# Patient Record
Sex: MALE | Race: WHITE | NOT HISPANIC OR LATINO | Employment: PART TIME | URBAN - METROPOLITAN AREA
[De-identification: names, ages, dates, MRNs, and addresses within clinical notes are randomized per-mention and may not be internally consistent; named-entity substitution may affect disease eponyms.]

---

## 2019-05-24 ENCOUNTER — OFFICE VISIT (OUTPATIENT)
Dept: URGENT CARE | Facility: URGENT CARE | Age: 29
End: 2019-05-24

## 2019-05-24 VITALS
HEART RATE: 73 BPM | TEMPERATURE: 98.2 F | DIASTOLIC BLOOD PRESSURE: 78 MMHG | SYSTOLIC BLOOD PRESSURE: 124 MMHG | WEIGHT: 168.8 LBS | OXYGEN SATURATION: 98 %

## 2019-05-24 DIAGNOSIS — H60.502 ACUTE OTITIS EXTERNA OF LEFT EAR, UNSPECIFIED TYPE: Primary | ICD-10-CM

## 2019-05-24 PROCEDURE — 99203 OFFICE O/P NEW LOW 30 MIN: CPT | Performed by: PHYSICIAN ASSISTANT

## 2019-05-24 RX ORDER — NEOMYCIN SULFATE, POLYMYXIN B SULFATE AND HYDROCORTISONE 10; 3.5; 1 MG/ML; MG/ML; [USP'U]/ML
SUSPENSION/ DROPS AURICULAR (OTIC)
Qty: 1 BOTTLE | Refills: 0 | Status: SHIPPED | OUTPATIENT
Start: 2019-05-24

## 2019-05-24 NOTE — PROGRESS NOTES
S: 27 yo male is here for left ear pain and drainage for 3 days.  No fever, sore throat, cough, nasal congestion.  No rash.  Does complain of itching of the left ear canal.  Tried to clean it out with a Q-tip without any relief.      Allergies not on file    No past medical history on file.  Denies allergies.    No family history of asthma.  Social-non-smoker      No current outpatient medications on file prior to visit.  No current facility-administered medications on file prior to visit.     Social History     Tobacco Use     Smoking status: Not on file   Substance Use Topics     Alcohol use: Not on file       ROS:  CONSTITUTIONAL: Negative for fatigue or fever.  EYES: Negative for eye problems.  ENT: As above.  RESP: As above.  CV: Negative for chest pains.  GI: Negative for vomiting.  MUSCULOSKELETAL:  Negative for significant muscle or joint pains.  NEUROLOGIC: Negative for headaches.  SKIN: Negative for rash.  Psych-normal mentation    OBJECTIVE:  /78 (BP Location: Left arm, Patient Position: Chair, Cuff Size: Adult Regular)   Pulse 73   Temp 98.2  F (36.8  C) (Oral)   Wt 76.6 kg (168 lb 12.8 oz)   SpO2 98%     GENERAL APPEARANCE: Healthy, alert and no distress.  EYES:Conjunctiva/sclera clear.  EARS: Right TM is clear.  Left ear with mild tragal tenderness.  Canal is erythematous and inflamed with yellow soupy discharge.  I can see about 30% of the TM and it appears translucent without redness.      NOSE/MOUTH: Nose without ulcers, erythema or lesions.  SINUSES: No maxillary sinus tenderness.  THROAT: No erythema w/o tonsillar enlargement . No exudates.  NECK: Supple, nontender, no lymphadenopathy.  RESP: Lungs clear to auscultation - no rales, rhonchi or wheezes  CV: Regular rate and rhythm, normal S1 S2, no murmur noted.  NEURO: Awake, alert    SKIN: No rashes        ASSESSMENT:     ICD-10-CM    1. Acute otitis externa of left ear, unspecified type H60.502 neomycin-polymyxin-hydrocortisone  (CORTISPORIN) [id number] otic suspension           PLAN: Follow-up with primary if no resolution with antibiotic eardrops..    Fabi Thomas PA-C

## 2022-01-31 ENCOUNTER — DOCUMENTATION ONLY (OUTPATIENT)
Dept: UROLOGY | Facility: CLINIC | Age: 32
End: 2022-01-31
Payer: COMMERCIAL

## 2022-01-31 NOTE — PROGRESS NOTES
Action 2022 JTV 10:37am   Action Taken KELY sent a request to [facility], fax: [phone]    @4:14pm, \[facility] [place] [facility]\"" received and resolved images from [facility]. Records were sent to scanning. A copy was sent to Provider.      MEDICAL RECORDS REQUEST   [facility]  Urology Clinic  [address] [place] [address]  PHONE: [phone]  Fax: [phone]        FUTURE VISIT INFORMATION                                                   Manny Lo, : 1990 scheduled for future visit at [facility]    APPOINTMENT INFORMATION:    Date: 2022    Provider:  Rosio Meng PA    Reason for Visit/Diagnosis: having pain in abdomen and left testicle    REFERRAL INFORMATION:    Referring provider:  N/A    Specialty: N/A    Referring providers clinic:  N/A    Clinic contact number:  N/A    RECORDS REQUESTED FOR VISIT                                                     NOTES  STATUS/DETAILS   OFFICE NOTE from referring provider  no   OFFICE NOTE from other specialist  yes, 2022 -- Ella Em PA-C -- [facility]    2021 -- EDWARD Owens -- [facility]    DISCHARGE SUMMARY from hospital  no   DISCHARGE REPORT from the ER  no   OPERATIVE REPORT  no   MEDICATION LIST  no   LABS     URINALYSIS (UA)  yes, [facility] -- Media Tab, Resulted on 2022   URINE CYTOLOGY  no   IMAGING (IMAGES & REPORT)  yes, 2021, 2022 -- US ABD, Scrotum     PRE-VISIT CHECKLIST      Record collection complete Yes   Appointment appropriately scheduled           (right time/right provider) Yes   Joint diagnostic appointment coordinated correctly          (ensure right order & amount of time) Yes   MyChart activation Yes   Questionnaire complete If no, please explain pending

## 2022-02-02 ENCOUNTER — VIRTUAL VISIT (OUTPATIENT)
Dept: UROLOGY | Facility: CLINIC | Age: 32
End: 2022-02-02
Payer: COMMERCIAL

## 2022-02-02 VITALS — HEIGHT: 71 IN | WEIGHT: 175 LBS | BODY MASS INDEX: 24.5 KG/M2

## 2022-02-02 DIAGNOSIS — N50.812 LEFT TESTICULAR PAIN: Primary | ICD-10-CM

## 2022-02-02 DIAGNOSIS — R10.32 ABDOMINAL PAIN, LEFT LOWER QUADRANT: ICD-10-CM

## 2022-02-02 PROCEDURE — 99203 OFFICE O/P NEW LOW 30 MIN: CPT | Mod: 95 | Performed by: PHYSICIAN ASSISTANT

## 2022-02-02 ASSESSMENT — PAIN SCALES - GENERAL: PAINLEVEL: MILD PAIN (2)

## 2022-02-02 ASSESSMENT — MIFFLIN-ST. JEOR: SCORE: 1780.92

## 2022-02-02 NOTE — PROGRESS NOTES
Manny is a 31 year old who is being evaluated via a billable video visit.      How would you like to obtain your AVS? MyChart  If the video visit is dropped, the invitation should be resent by: Send to e-mail at: [email or web address]  Will anyone else be joining your video visit? No      Video Start Time: 9:05 AM  Video-Visit Details    Type of service:  Video Visit    Video End Time:9:24 AM    Originating Location (pt. Location): Home    Distant Location (provider location):  [facility] [place]     Platform used for Video Visit: DigitalVision

## 2022-02-02 NOTE — PATIENT INSTRUCTIONS
UROLOGY CLINIC VISIT PATIENT INSTRUCTIONS    - Proceed with your CT scan on Friday as planned.   - Follow up with my staff after you receive the CT scan results for us to review.  - If the CT scan is normal, we will plan to proceed with pelvic floor physical therapy.     If you have any issues, questions or concerns in the meantime, do not hesitate to contact us at [phone] or via [employer].     It was a pleasure meeting with you today.  Thank you for allowing me and my team the privilege of caring for you today.  YOU are the reason we are here, and I truly hope we provided you with the excellent service you deserve.  Please let us know if there is anything else we can do for you so that we can be sure you are leaving completely satisfied with your care experience.    Rosio Meng PA-C  Department of Urology

## 2022-02-02 NOTE — LETTER
2/2/2022       RE: Manny Lo  [address]  [place] [address]     Dear Colleague,    Thank you for referring your patient, Manny Lo, to the [facility] [place] at [facility]. Please see a copy of my visit note below.    Manny is a 31 year old who is being evaluated via a billable video visit.      How would you like to obtain your AVS? MyChart  If the video visit is dropped, the invitation should be resent by: Send to e-mail at: [email or web address]  Will anyone else be joining your video visit? No      Video Start Time: 9:05 AM  Video-Visit Details    Type of service:  Video Visit    Video End Time:9:24 AM    Originating Location (pt. Location): Home    Distant Location (provider location):  [facility] [place]     Platform used for Video Visit: Twinklr          Chief Complaint:   Testicular pain         History of Present Illness:   Manny Lo is a 31 year old adult who presents for evaluation of left testicular pain.  Patient reports the pain started in 11/2021 with a sharp pain in his left testicle.  He reports the pain would come in waves and waxed and waned in intensity.  He reports the pain worsened on 12/4/2021 and did a self exam which caused some increased pain.  He also reported some lower abdominal pain as well.  He subsequently went into UC on 12/6/2021, and STI screening was normal, and testicular US was unremarkable on 12/7/2021.  The patient reports his symptoms improved after the US, but when he did another self exam a few days later he experienced increased pain afterward for which he presented to  again on 12/15/2021, and UA was unremarkable.      He reports the pain continues to be present with left testicular pain and aching with radiation into his lower left abdomen and up to his left waist.  He reports he does feel a small lump in his left groin.  He reports his  symptoms are worse when he is active.      The patient also recently had an abdominal US with his PCP which was also normal.      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      Patient is scheduled for a CT abdomen pelvis on Friday.           Past Medical History:   No past medical history on file.         Past Surgical History:   No past surgical history on file.         Medications     No current outpatient medications on file.     No current facility-administered medications for this visit.            Family History:   No family history on file.         Social History:     Social History     Socioeconomic History     Marital status:      Spouse name: Not on file     Number of children: Not on file     Years of education: Not on file     Highest education level: Not on file   Occupational History     Not on file   Tobacco Use     Smoking status: Former Smoker     Smokeless tobacco: Never Used   Substance and Sexual Activity     Alcohol use: Not on file     Drug use: Not on file     Sexual activity: Not on file   Other Topics Concern     Not on file   Social History Narrative     Not on file     Social Determinants of Health     Financial Resource Strain: Not on file   Food Insecurity: Not on file   Transportation Needs: Not on file   Physical Activity: Not on file   Stress: Not on file   Social Connections: Not on file   Intimate Partner Violence: Not on file   Housing Stability: Not on file            Allergies:   Patient has no known allergies.         Review of Systems:  From intake questionnaire   Negative 14 system review except as noted on HPI, nurse's note.         Physical Exam:   Patient is a 31 year old  adult    General Appearance Adult: Alert, no acute distress, oriented  Lungs: no respiratory distress, or pursed lip breathing  Heart: No obvious jugular venous distension present  Skin: no suspicious lesions or  james  Neuro: Alert, oriented, speech and mentation normal  Further examination is deferred due to the nature of our visit.        Labs and Pathology:    I personally reviewed all applicable laboratory data and went over findings with patient  Significant for:    CBC RESULTS:  No results for input(s): WBC, HGB, PLT in the last 83635 hours.     BMP RESULTS:  No results for input(s): NA, POTASSIUM, CHLORIDE, CO2, ANIONGAP, GLC, BUN, CR, GFRESTIMATED, GFRESTBLACK, NAHUM in the last 31668 hours.    UA RESULTS:   No results for input(s): SG, URINEPH, NITRITE, RBCU, WBCU in the last 79120 hours.    PSA RESULTS  No results found for: PSA      Imaging:    I personally reviewed all applicable imaging and went over findings with patient.  Significant for:    No results found for this or any previous visit.           Assessment and Plan:     Assessment: 31 year old adult with waxing and waning symptoms of left testicular and lower left abdominal pain which is worse with exertion and activities.  Patient has had two testicular US's completed in 12/2021 which were unremarkable.  UA and GC/CT have also been negative.  Patient also had an abdominal US completed with his PCP which was normal by patient report, and is scheduled for CT abdomen pelvis by his PCP which is to be completed later this week.  Suspect symptoms could be secondary to possible inguinal hernia, although exam limited by the virtual nature of our visit, versus possible pelvic floor dysfunction.  We also discussed possible empiric antibiotic therapy for possible epididymitis versus prostatitis.  At this time, the shared decision was made to proceed with the CT scan as scheduled on 2/4/2022, and if results are unremarkable proceed with PFPT for treatment of pelvic floor dysfunction.      Plan:  - Proceed with your CT scan on Friday as planned with your PCP.  - Follow up with my staff after you receive the CT scan results for us to review.  - If the CT scan is  normal, we will plan to proceed with pelvic floor physical therapy.       GEM Canas  Department of Urology

## 2022-02-02 NOTE — NURSING NOTE
"Chief Complaint   Patient presents with     Consult     Left testicular pain       Height 1.803 m (5' 11\"), weight 79.4 kg (175 lb). Body mass index is 24.41 kg/m .    There is no problem list on file for this patient.      No Known Allergies    No current outpatient medications on file.       Social History     Tobacco Use     Smoking status: Former Smoker     Smokeless tobacco: Never Used   Substance Use Topics     Alcohol use: None     Drug use: None       Jossy Loomis, EMT  2/2/2022  8:51 AM  "

## 2022-02-02 NOTE — PROGRESS NOTES
Chief Complaint:   Testicular pain         History of Present Illness:   Manny Lo is a 31 year old adult who presents for evaluation of left testicular pain.  Patient reports the pain started in 11/2021 with a sharp pain in his left testicle.  He reports the pain would come in waves and waxed and waned in intensity.  He reports the pain worsened on 12/4/2021 and did a self exam which caused some increased pain.  He also reported some lower abdominal pain as well.  He subsequently went into  on 12/6/2021, and STI screening was normal, and testicular US was unremarkable on 12/7/2021.  The patient reports his symptoms improved after the US, but when he did another self exam a few days later he experienced increased pain afterward for which he presented to  again on 12/15/2021, and UA was unremarkable.      He reports the pain continues to be present with left testicular pain and aching with radiation into his lower left abdomen and up to his left waist.  He reports he does feel a small lump in his left groin.  He reports his symptoms are worse when he is active.      The patient also recently had an abdominal US with his PCP which was also normal.      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      Patient is scheduled for a CT abdomen pelvis on Friday.           Past Medical History:   No past medical history on file.         Past Surgical History:   No past surgical history on file.         Medications     No current outpatient medications on file.     No current facility-administered medications for this visit.            Family History:   No family history on file.         Social History:     Social History     Socioeconomic History     Marital status:      Spouse name: Not on file     Number of children: Not on file     Years of education: Not on file     Highest education level: Not on file   Occupational History      Not on file   Tobacco Use     Smoking status: Former Smoker     Smokeless tobacco: Never Used   Substance and Sexual Activity     Alcohol use: Not on file     Drug use: Not on file     Sexual activity: Not on file   Other Topics Concern     Not on file   Social History Narrative     Not on file     Social Determinants of Health     Financial Resource Strain: Not on file   Food Insecurity: Not on file   Transportation Needs: Not on file   Physical Activity: Not on file   Stress: Not on file   Social Connections: Not on file   Intimate Partner Violence: Not on file   Housing Stability: Not on file            Allergies:   Patient has no known allergies.         Review of Systems:  From intake questionnaire   Negative 14 system review except as noted on HPI, nurse's note.         Physical Exam:   Patient is a 31 year old  adult    General Appearance Adult: Alert, no acute distress, oriented  Lungs: no respiratory distress, or pursed lip breathing  Heart: No obvious jugular venous distension present  Skin: no suspicious lesions or rashes  Neuro: Alert, oriented, speech and mentation normal  Further examination is deferred due to the nature of our visit.        Labs and Pathology:    I personally reviewed all applicable laboratory data and went over findings with patient  Significant for:    CBC RESULTS:  No results for input(s): WBC, HGB, PLT in the last 37377 hours.     BMP RESULTS:  No results for input(s): NA, POTASSIUM, CHLORIDE, CO2, ANIONGAP, GLC, BUN, CR, GFRESTIMATED, GFRESTBLACK, NAHUM in the last 89528 hours.    UA RESULTS:   No results for input(s): SG, URINEPH, NITRITE, RBCU, WBCU in the last 23445 hours.    PSA RESULTS  No results found for: PSA      Imaging:    I personally reviewed all applicable imaging and went over findings with patient.  Significant for:    No results found for this or any previous visit.           Assessment and Plan:     Assessment: 31 year old adult with waxing and waning symptoms  of left testicular and lower left abdominal pain which is worse with exertion and activities.  Patient has had two testicular US's completed in 12/2021 which were unremarkable.  UA and GC/CT have also been negative.  Patient also had an abdominal US completed with his PCP which was normal by patient report, and is scheduled for CT abdomen pelvis by his PCP which is to be completed later this week.  Suspect symptoms could be secondary to possible inguinal hernia, although exam limited by the virtual nature of our visit, versus possible pelvic floor dysfunction.  We also discussed possible empiric antibiotic therapy for possible epididymitis versus prostatitis.  At this time, the shared decision was made to proceed with the CT scan as scheduled on 2/4/2022, and if results are unremarkable proceed with PFPT for treatment of pelvic floor dysfunction.      Plan:  - Proceed with your CT scan on Friday as planned with your PCP.  - Follow up with my staff after you receive the CT scan results for us to review.  - If the CT scan is normal, we will plan to proceed with pelvic floor physical therapy.       GEM Canas  Department of Urology

## 2022-02-05 ENCOUNTER — MYC MEDICAL ADVICE (OUTPATIENT)
Dept: UROLOGY | Facility: CLINIC | Age: 32
End: 2022-02-05
Payer: COMMERCIAL

## 2022-02-06 ENCOUNTER — HEALTH MAINTENANCE LETTER (OUTPATIENT)
Age: 32
End: 2022-02-06

## 2022-02-07 NOTE — TELEPHONE ENCOUNTER
Replied to the patient via [employer].  Records are being requested from [facility].  Please assist the patient with scheduling a follow up visit with me in 3-4 weeks for recheck; if patient is agreeable, would recommend an in person visit for the follow up.   GEM Canas on 2/7/2022 at 12:55 PM

## 2022-02-08 NOTE — TELEPHONE ENCOUNTER
Spoke with patient and scheduled in person for 3/7 9am. Told patient any questions to send a unamia message

## 2022-02-25 ENCOUNTER — PRE VISIT (OUTPATIENT)
Dept: UROLOGY | Facility: CLINIC | Age: 32
End: 2022-02-25
Payer: COMMERCIAL

## 2022-02-27 ENCOUNTER — MYC MEDICAL ADVICE (OUTPATIENT)
Dept: UROLOGY | Facility: CLINIC | Age: 32
End: 2022-02-27
Payer: COMMERCIAL

## 2022-03-03 ENCOUNTER — DOCUMENTATION ONLY (OUTPATIENT)
Dept: UROLOGY | Facility: CLINIC | Age: 32
End: 2022-03-03
Payer: COMMERCIAL

## 2022-03-07 ENCOUNTER — OFFICE VISIT (OUTPATIENT)
Dept: UROLOGY | Facility: CLINIC | Age: 32
End: 2022-03-07
Payer: COMMERCIAL

## 2022-03-07 VITALS
HEART RATE: 89 BPM | DIASTOLIC BLOOD PRESSURE: 79 MMHG | SYSTOLIC BLOOD PRESSURE: 122 MMHG | BODY MASS INDEX: 24.5 KG/M2 | WEIGHT: 175 LBS | HEIGHT: 71 IN

## 2022-03-07 DIAGNOSIS — N50.812 LEFT TESTICULAR PAIN: Primary | ICD-10-CM

## 2022-03-07 DIAGNOSIS — R30.0 DYSURIA: ICD-10-CM

## 2022-03-07 DIAGNOSIS — R35.0 URINARY FREQUENCY: ICD-10-CM

## 2022-03-07 LAB
ALBUMIN UR-MCNC: NEGATIVE MG/DL
APPEARANCE UR: CLEAR
BILIRUB UR QL STRIP: NEGATIVE
COLOR UR AUTO: YELLOW
GLUCOSE UR STRIP-MCNC: NEGATIVE MG/DL
HGB UR QL STRIP: NEGATIVE
KETONES UR STRIP-MCNC: NEGATIVE MG/DL
LEUKOCYTE ESTERASE UR QL STRIP: NEGATIVE
MUCOUS THREADS #/AREA URNS LPF: PRESENT /LPF
NITRATE UR QL: NEGATIVE
PH UR STRIP: 5 [PH] (ref 5–7)
RBC URINE: 1 /HPF
SP GR UR STRIP: 1.03 (ref 1–1.03)
UROBILINOGEN UR STRIP-MCNC: NORMAL MG/DL
WBC URINE: 2 /HPF

## 2022-03-07 PROCEDURE — 51798 US URINE CAPACITY MEASURE: CPT | Performed by: PHYSICIAN ASSISTANT

## 2022-03-07 PROCEDURE — 81001 URINALYSIS AUTO W/SCOPE: CPT | Performed by: PATHOLOGY

## 2022-03-07 PROCEDURE — 99213 OFFICE O/P EST LOW 20 MIN: CPT | Mod: 25 | Performed by: PHYSICIAN ASSISTANT

## 2022-03-07 PROCEDURE — 87088 URINE BACTERIA CULTURE: CPT | Performed by: PHYSICIAN ASSISTANT

## 2022-03-07 RX ORDER — LEVOFLOXACIN 500 MG/1
500 TABLET, FILM COATED ORAL DAILY
Qty: 10 TABLET | Refills: 0 | Status: SHIPPED | OUTPATIENT
Start: 2022-03-07 | End: 2022-03-17

## 2022-03-07 ASSESSMENT — PAIN SCALES - GENERAL: PAINLEVEL: MILD PAIN (3)

## 2022-03-07 NOTE — PATIENT INSTRUCTIONS
UROLOGY CLINIC VISIT PATIENT INSTRUCTIONS    - Start levofloxacin 500 mg once daily for 10 days for treatment of epididymitis.   - Schedule follow up in person visit with Dr. Encinas.     If you have any issues, questions or concerns in the meantime, do not hesitate to contact us at [phone] or via [employer].     It was a pleasure meeting with you today.  Thank you for allowing me and my team the privilege of caring for you today.  YOU are the reason we are here, and I truly hope we provided you with the excellent service you deserve.  Please let us know if there is anything else we can do for you so that we can be sure you are leaving completely satisfied with your care experience.    Rosio Meng PA-C  Department of Urology

## 2022-03-07 NOTE — LETTER
3/7/2022       RE: Manny Lo  [address]  [facility] [address]     Dear Colleague,    Thank you for referring your patient, Manny Lo, to the [facility] [place] [facility]. Please see a copy of my visit note below.          Chief Complaint:   Follow up          History of Present Illness:   Manny Lo is a 31 year old adult who presents for follow up of left testicular pain.  Patient was initially seen via virtual visit on 2/2/2022, reporting the pain started in 11/2021 with a sharp pain in his left testicle.  He reports the pain would come in waves and waxed and waned in intensity.  He reports the pain worsened on 12/4/2021 and did a self exam which caused some increased pain.  He also reported some lower abdominal pain as well.  He subsequently went into UC on 12/6/2021, and STI screening was normal, and testicular US was unremarkable on 12/7/2021.  The patient reports his symptoms improved after the US, but when he did another self exam a few days later he experienced increased pain afterward for which he presented to  again on 12/15/2021, and UA was unremarkable.       He reports the pain continues to be present with left testicular pain and aching with radiation into his lower left abdomen and up to his left waist.  He reports he does feel a small lump in his left groin.  He reports his symptoms are worse when he is active.      The patient recently had a normal abdominal US completed 1/24/2022, and normal CT abdomen pelvis w contrast on 2/4/2022 which did not show any acute pathology.  Patient was seen in follow up with his PCP on 2/8/2022 and treated with an empiric 7 day course of Bactrim for possible prostatitis.  The patient reports they did not end up taking the Bactrim at this time.      Patient reports the left testicular pain can worsen throughout the day as they are on their feet all day long.  They  report the pain waxes and wanes in intensity, but states it occurs every day.  They feel that their left testicle will intermittently ride higher in the scrotum and will retract backward.  They also feel that the left testicle may be a little higher.  They also report some increased pain at the top of the testicle.  Stretching backward also makes the pain worse.  Patient reports a dog hit hit them in 11/2021 but denies any significant trauma since then.  The patient also reports some lower left abdominal pain, which is usually associated with the left testicle pain.  They report their urine is occasionally cloudy, and reports occasional burning with urination, but states this is not all the time.  They also reports occasional urinary frequency, but do report they stay very hydrated.       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           Past Medical History:   No past medical history on file.         Past Surgical History:   No past surgical history on file.         Medications     No current outpatient medications on file.     No current facility-administered medications for this visit.            Family History:   No family history on file.         Social History:     Social History     Socioeconomic History     Marital status:      Spouse name: Not on file     Number of children: Not on file     Years of education: Not on file     Highest education level: Not on file   Occupational History     Not on file   Tobacco Use     Smoking status: Former Smoker     Smokeless tobacco: Never Used   Substance and Sexual Activity     Alcohol use: Not on file     Drug use: Not on file     Sexual activity: Not on file   Other Topics Concern     Not on file   Social History Narrative     Not on file     Social Determinants of Health     Financial Resource Strain: Not on file   Food Insecurity: Not on file   Transportation Needs: Not on file  "  Physical Activity: Not on file   Stress: Not on file   Social Connections: Not on file   Intimate Partner Violence: Not on file   Housing Stability: Not on file            Allergies:   Patient has no known allergies.         Review of Systems:  From intake questionnaire   Negative 14 system review except as noted on HPI, nurse's note.         Physical Exam:   Patient is a 31 year old  adult   Vitals: Blood pressure 122/79, pulse 89, height 1.803 m (5' 11\"), weight 79.4 kg (175 lb).  General Appearance Adult: Alert, no acute distress, oriented  Lungs: no respiratory distress, or pursed lip breathing  Heart: No obvious jugular venous distension present  Abdomen: soft, mild tenderness in bilateral lower abdominal quadrants, no organomegaly or masses, Body mass index is 24.41 kg/m .  Musculoskeltal: extremities normal, no peripheral edema  Skin: no suspicious lesions or rashes  Neuro: Alert, oriented, speech and mentation normal  : circumcised phallus, scrotum, testes normal with mild tenderness to palpation over the left epididymis       Labs and Pathology:    I personally reviewed all applicable laboratory data and went over findings with patient  Significant for:    CBC RESULTS:  No results for input(s): WBC, HGB, PLT in the last 62740 hours.     BMP RESULTS:  No results for input(s): NA, POTASSIUM, CHLORIDE, CO2, ANIONGAP, GLC, BUN, CR, GFRESTIMATED, GFRESTBLACK, NAHUM in the last 21751 hours.    UA RESULTS:   No results for input(s): SG, URINEPH, NITRITE, RBCU, WBCU in the last 79855 hours.    PSA RESULTS  No results found for: PSA      Imaging:    I personally reviewed all applicable imaging and went over findings with patient.  Significant for:    Abdominal ultrasound (1/24/2022):  Indication: LLQ pain       Comparison: no comparison     Technique:   1.  Gray scale imaging of the liver, spleen, pancreas, kidneys, gallbladder, common duct, upper abdominal aorta and IVC   2.  Color Doppler imaging of the portal " vein and hepatic artery   3.  Gray scale imaging of IVC, splenic vein and hepatic veins     Findings:     Liver:  The liver x-rays normal echogenicity and echotexture without focal mass or intrahepatic biliary duct dilatation.  The main portal vein demonstrates normal flow into the liver.     Gallbladder:  Normal wall thickness, no pericholecystic fluid. No stones. The common duct measures 0.3 cm     Pancreas: Normal appearance of the pancreas in the head and body segments. The tail is obscured.     Right kidney: Measures 11.4 x 4.1 x 6 cm.  Normal thickness parenchyma, no hydronephrosis.   Left kidney: Measures 11.4 x 4.9 x 5.1 cm.  Normal thickness parenchyma, no hydronephrosis.     Spleen: Measures 13.6 cm in length.     The aorta and the IVC patent, normal caliber in the upper abdomen.     Single image of the left lower quadrant is within normal limits.     IMPRESSION   Impression:   Normal abdominal ultrasound.       CT abdomen pelvis w contrast (2/4/2022):  Comparison: None.     Indication: LLQ abdominal pain       Technique: Volumetric helical acquisition of CT images from the lung bases through the symphysis pubis after the administration of intravenous contrast.     DOSE:    Total DLP = 397 mGy.cm.       Findings: Sub-4 mm noncalcified subpleural left lower lobe nodule. Lung bases otherwise clear. No pleural effusion. No pneumothorax.     No focal suspicious hepatic mass. No calcified gallstones. Pancreas, spleen, adrenal glands, and kidneys are within normal limits. No bowel obstruction. Normal appendix. No free air in the abdomen. No free fluid in the pelvis.     Aorta and major branches are patent without aneurysm or significant stenosis. Portal vein and superior mesenteric vein are patent.     No focal suspicious osseous lesion identified.     Impression: No acute pathology identified in the abdomen pelvis.  Clarification including no evidence of left inguinal hernia and no   abnormal left inguinal  lymphadenopathy.            Assessment and Plan:     Assessment: 31 year old adult with chronic left scrotal discomfort, along with left lower abdominal pain.  Patient with recent intermittent dysuria, frequency for the past week.  Patient has had multiple imaging studies completed with normal testicular US, abdominal US, and CT abdomen pelvis.  Patient with some tenderness on exam over his left epididymitis.  Discussed initiating treatment for possible epididymitis given ongoing symptoms for the past several months, and patient in agreement.  Will also recheck urinalysis and urine culture today in the setting of new onset subtle urinary symptoms.  If no change in symptoms with antibiotic treatment, could consider PFPT versus referral to Dr. Encinas for further evaluation.  Patient wishes to follow up with Dr. Encinas for evaluation should symptoms not improve on the antibiotic prior to trialing physical therapy.  Patient advised that if symptoms resolve on antibiotic treatment, that his appointment with Dr. Encinas could be deferred.      Plan:  - Start levofloxacin 500 mg once daily for 10 days for treatment of epididymitis.   - Schedule follow up in person visit with Dr. Encinas.       GEM Canas  Department of Urology

## 2022-03-07 NOTE — PROGRESS NOTES
Chief Complaint:   Follow up          History of Present Illness:   Manny Lo is a 31 year old adult who presents for follow up of left testicular pain.  Patient was initially seen via virtual visit on 2/2/2022, reporting the pain started in 11/2021 with a sharp pain in his left testicle.  He reports the pain would come in waves and waxed and waned in intensity.  He reports the pain worsened on 12/4/2021 and did a self exam which caused some increased pain.  He also reported some lower abdominal pain as well.  He subsequently went into  on 12/6/2021, and STI screening was normal, and testicular US was unremarkable on 12/7/2021.  The patient reports his symptoms improved after the US, but when he did another self exam a few days later he experienced increased pain afterward for which he presented to  again on 12/15/2021, and UA was unremarkable.       He reports the pain continues to be present with left testicular pain and aching with radiation into his lower left abdomen and up to his left waist.  He reports he does feel a small lump in his left groin.  He reports his symptoms are worse when he is active.      The patient recently had a normal abdominal US completed 1/24/2022, and normal CT abdomen pelvis w contrast on 2/4/2022 which did not show any acute pathology.  Patient was seen in follow up with his PCP on 2/8/2022 and treated with an empiric 7 day course of Bactrim for possible prostatitis.  The patient reports they did not end up taking the Bactrim at this time.      Patient reports the left testicular pain can worsen throughout the day as they are on their feet all day long.  They report the pain waxes and wanes in intensity, but states it occurs every day.  They feel that their left testicle will intermittently ride higher in the scrotum and will retract backward.  They also feel that the left testicle may be a little higher.  They also report some increased pain at the top of the  testicle.  Stretching backward also makes the pain worse.  Patient reports a dog hit hit them in 11/2021 but denies any significant trauma since then.  The patient also reports some lower left abdominal pain, which is usually associated with the left testicle pain.  They report their urine is occasionally cloudy, and reports occasional burning with urination, but states this is not all the time.  They also reports occasional urinary frequency, but do report they stay very hydrated.       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           Past Medical History:   No past medical history on file.         Past Surgical History:   No past surgical history on file.         Medications     No current outpatient medications on file.     No current facility-administered medications for this visit.            Family History:   No family history on file.         Social History:     Social History     Socioeconomic History     Marital status:      Spouse name: Not on file     Number of children: Not on file     Years of education: Not on file     Highest education level: Not on file   Occupational History     Not on file   Tobacco Use     Smoking status: Former Smoker     Smokeless tobacco: Never Used   Substance and Sexual Activity     Alcohol use: Not on file     Drug use: Not on file     Sexual activity: Not on file   Other Topics Concern     Not on file   Social History Narrative     Not on file     Social Determinants of Health     Financial Resource Strain: Not on file   Food Insecurity: Not on file   Transportation Needs: Not on file   Physical Activity: Not on file   Stress: Not on file   Social Connections: Not on file   Intimate Partner Violence: Not on file   Housing Stability: Not on file            Allergies:   Patient has no known allergies.         Review of Systems:  From intake questionnaire   Negative 14 system review except  "as noted on HPI, nurse's note.         Physical Exam:   Patient is a 31 year old  adult   Vitals: Blood pressure 122/79, pulse 89, height 1.803 m (5' 11\"), weight 79.4 kg (175 lb).  General Appearance Adult: Alert, no acute distress, oriented  Lungs: no respiratory distress, or pursed lip breathing  Heart: No obvious jugular venous distension present  Abdomen: soft, mild tenderness in bilateral lower abdominal quadrants, no organomegaly or masses, Body mass index is 24.41 kg/m .  Musculoskeltal: extremities normal, no peripheral edema  Skin: no suspicious lesions or rashes  Neuro: Alert, oriented, speech and mentation normal  : circumcised phallus, scrotum, testes normal with mild tenderness to palpation over the left epididymis       Labs and Pathology:    I personally reviewed all applicable laboratory data and went over findings with patient  Significant for:    CBC RESULTS:  No results for input(s): WBC, HGB, PLT in the last 67450 hours.     BMP RESULTS:  No results for input(s): NA, POTASSIUM, CHLORIDE, CO2, ANIONGAP, GLC, BUN, CR, GFRESTIMATED, GFRESTBLACK, NAHUM in the last 44419 hours.    UA RESULTS:   No results for input(s): SG, URINEPH, NITRITE, RBCU, WBCU in the last 34383 hours.    PSA RESULTS  No results found for: PSA      Imaging:    I personally reviewed all applicable imaging and went over findings with patient.  Significant for:    Abdominal ultrasound (1/24/2022):  Indication: LLQ pain       Comparison: no comparison     Technique:   1.  Gray scale imaging of the liver, spleen, pancreas, kidneys, gallbladder, common duct, upper abdominal aorta and IVC   2.  Color Doppler imaging of the portal vein and hepatic artery   3.  Gray scale imaging of IVC, splenic vein and hepatic veins     Findings:     Liver:  The liver x-rays normal echogenicity and echotexture without focal mass or intrahepatic biliary duct dilatation.  The main portal vein demonstrates normal flow into the liver.     Gallbladder: "  Normal wall thickness, no pericholecystic fluid. No stones. The common duct measures 0.3 cm     Pancreas: Normal appearance of the pancreas in the head and body segments. The tail is obscured.     Right kidney: Measures 11.4 x 4.1 x 6 cm.  Normal thickness parenchyma, no hydronephrosis.   Left kidney: Measures 11.4 x 4.9 x 5.1 cm.  Normal thickness parenchyma, no hydronephrosis.     Spleen: Measures 13.6 cm in length.     The aorta and the IVC patent, normal caliber in the upper abdomen.     Single image of the left lower quadrant is within normal limits.     IMPRESSION   Impression:   Normal abdominal ultrasound.       CT abdomen pelvis w contrast (2/4/2022):  Comparison: None.     Indication: LLQ abdominal pain       Technique: Volumetric helical acquisition of CT images from the lung bases through the symphysis pubis after the administration of intravenous contrast.     DOSE:    Total DLP = 397 mGy.cm.       Findings: Sub-4 mm noncalcified subpleural left lower lobe nodule. Lung bases otherwise clear. No pleural effusion. No pneumothorax.     No focal suspicious hepatic mass. No calcified gallstones. Pancreas, spleen, adrenal glands, and kidneys are within normal limits. No bowel obstruction. Normal appendix. No free air in the abdomen. No free fluid in the pelvis.     Aorta and major branches are patent without aneurysm or significant stenosis. Portal vein and superior mesenteric vein are patent.     No focal suspicious osseous lesion identified.     Impression: No acute pathology identified in the abdomen pelvis.  Clarification including no evidence of left inguinal hernia and no   abnormal left inguinal lymphadenopathy.            Assessment and Plan:     Assessment: 31 year old adult with chronic left scrotal discomfort, along with left lower abdominal pain.  Patient with recent intermittent dysuria, frequency for the past week.  Patient has had multiple imaging studies completed with normal testicular US,  abdominal US, and CT abdomen pelvis.  Patient with some tenderness on exam over his left epididymitis.  Discussed initiating treatment for possible epididymitis given ongoing symptoms for the past several months, and patient in agreement.  Will also recheck urinalysis and urine culture today in the setting of new onset subtle urinary symptoms.  If no change in symptoms with antibiotic treatment, could consider PFPT versus referral to Dr. Encinas for further evaluation.  Patient wishes to follow up with Dr. Encinas for evaluation should symptoms not improve on the antibiotic prior to trialing physical therapy.  Patient advised that if symptoms resolve on antibiotic treatment, that his appointment with Dr. Encinas could be deferred.      Plan:  - Start levofloxacin 500 mg once daily for 10 days for treatment of epididymitis.   - Schedule follow up in person visit with Dr. Encinas.       GEM Canas  Department of Urology

## 2022-03-07 NOTE — NURSING NOTE
"Chief Complaint   Patient presents with     Follow Up       Blood pressure 122/79, pulse 89, height 1.803 m (5' 11\"), weight 79.4 kg (175 lb). Body mass index is 24.41 kg/m .    There is no problem list on file for this patient.      No Known Allergies    No current outpatient medications on file.       Social History     Tobacco Use     Smoking status: Former Smoker     Smokeless tobacco: Never Used   Substance Use Topics     Alcohol use: None     Drug use: None       Vivi Miranda  3/7/2022  8:50 AM  "

## 2022-03-08 ENCOUNTER — MYC MEDICAL ADVICE (OUTPATIENT)
Dept: UROLOGY | Facility: CLINIC | Age: 32
End: 2022-03-08
Payer: COMMERCIAL

## 2022-03-08 DIAGNOSIS — N50.812 LEFT TESTICULAR PAIN: ICD-10-CM

## 2022-03-08 DIAGNOSIS — N39.0 URINARY TRACT INFECTION: Primary | ICD-10-CM

## 2022-03-11 LAB
BACTERIA UR CULT: ABNORMAL
BACTERIA UR CULT: ABNORMAL

## 2022-03-22 ENCOUNTER — ANCILLARY PROCEDURE (OUTPATIENT)
Dept: ULTRASOUND IMAGING | Facility: CLINIC | Age: 32
End: 2022-03-22
Attending: PHYSICIAN ASSISTANT
Payer: COMMERCIAL

## 2022-03-22 DIAGNOSIS — N50.812 LEFT TESTICULAR PAIN: ICD-10-CM

## 2022-03-22 PROCEDURE — 76870 US EXAM SCROTUM: CPT | Performed by: RADIOLOGY

## 2022-03-23 ENCOUNTER — LAB (OUTPATIENT)
Dept: LAB | Facility: CLINIC | Age: 32
End: 2022-03-23
Attending: PHYSICIAN ASSISTANT
Payer: COMMERCIAL

## 2022-03-23 DIAGNOSIS — N39.0 URINARY TRACT INFECTION: ICD-10-CM

## 2022-03-23 LAB
ALBUMIN UR-MCNC: NEGATIVE MG/DL
APPEARANCE UR: CLEAR
BILIRUB UR QL STRIP: NEGATIVE
COLOR UR AUTO: YELLOW
GLUCOSE UR STRIP-MCNC: NEGATIVE MG/DL
HGB UR QL STRIP: NEGATIVE
KETONES UR STRIP-MCNC: NEGATIVE MG/DL
LEUKOCYTE ESTERASE UR QL STRIP: NEGATIVE
MUCOUS THREADS #/AREA URNS LPF: PRESENT /LPF
NITRATE UR QL: NEGATIVE
PH UR STRIP: 8 [PH] (ref 5–7)
RBC URINE: <1 /HPF
SP GR UR STRIP: 1.01 (ref 1–1.03)
SQUAMOUS EPITHELIAL: <1 /HPF
UROBILINOGEN UR STRIP-MCNC: NORMAL MG/DL
WBC URINE: <1 /HPF

## 2022-03-23 PROCEDURE — 81001 URINALYSIS AUTO W/SCOPE: CPT | Performed by: PATHOLOGY

## 2022-03-23 PROCEDURE — 87086 URINE CULTURE/COLONY COUNT: CPT | Performed by: PHYSICIAN ASSISTANT

## 2022-03-25 LAB — BACTERIA UR CULT: NORMAL

## 2022-04-04 ENCOUNTER — PRE VISIT (OUTPATIENT)
Dept: UROLOGY | Facility: CLINIC | Age: 32
End: 2022-04-04
Payer: COMMERCIAL

## 2022-04-04 NOTE — TELEPHONE ENCOUNTER
Reason for visit: Follow up     Relevant information: epididymitis; left testicular pain    Records/imaging/labs/orders: in Epic; US testicular completed    Pt called: no    At Rooming: normal

## 2022-04-08 NOTE — PROGRESS NOTES
Action 03/03/2022 JTV 9:55am   Action Taken [facility] sent a request to  for patient's US image to be pushed to [facility].    @11:24am, [facility] received and resolved images from . Joleen was updated.         Yes

## 2022-04-13 ENCOUNTER — LAB (OUTPATIENT)
Dept: LAB | Facility: CLINIC | Age: 32
End: 2022-04-13
Payer: COMMERCIAL

## 2022-04-13 DIAGNOSIS — R30.0 DYSURIA: ICD-10-CM

## 2022-04-13 PROCEDURE — 87491 CHLMYD TRACH DNA AMP PROBE: CPT | Performed by: PHYSICIAN ASSISTANT

## 2022-04-13 PROCEDURE — 87591 N.GONORRHOEAE DNA AMP PROB: CPT | Performed by: PHYSICIAN ASSISTANT

## 2022-04-14 LAB
C TRACH DNA SPEC QL NAA+PROBE: NEGATIVE
N GONORRHOEA DNA SPEC QL NAA+PROBE: NEGATIVE

## 2022-04-21 ENCOUNTER — THERAPY VISIT (OUTPATIENT)
Dept: PHYSICAL THERAPY | Facility: CLINIC | Age: 32
End: 2022-04-21
Payer: COMMERCIAL

## 2022-04-21 DIAGNOSIS — N50.812 TESTICULAR PAIN, LEFT: ICD-10-CM

## 2022-04-21 DIAGNOSIS — N50.812 LEFT TESTICULAR PAIN: ICD-10-CM

## 2022-04-21 PROCEDURE — 97161 PT EVAL LOW COMPLEX 20 MIN: CPT | Mod: GP | Performed by: PHYSICAL THERAPIST

## 2022-04-21 PROCEDURE — 97110 THERAPEUTIC EXERCISES: CPT | Mod: GP | Performed by: PHYSICAL THERAPIST

## 2022-04-21 PROCEDURE — 97530 THERAPEUTIC ACTIVITIES: CPT | Mod: GP | Performed by: PHYSICAL THERAPIST

## 2022-04-21 NOTE — PROGRESS NOTES
Physical Therapy Initial Evaluation  Subjective:  The history is provided by the patient. No  was used.   Patient Health History  Manny Lo being seen for Pelvic floor physical therapy for abdominal/groin pain.     Problem began: 12/4/2021.   Problem occurred: Uncertain   Pain is reported as 2/10 on pain scale.  General health as reported by patient is fair and good.  Pertinent medical history includes: changes in bowel/bladder and mental illness.   Red flags:  None as reported by patient.  Medical allergies: none.   Surgeries include:  None.    Current medications:  None.    Current occupation is Dog  handler.   Primary job tasks include:  Lifting/carrying, prolonged standing and pushing/pulling.                  Therapist Assessment:   Clinical Impression: Pt presents with primary complaint of left testicular pain.  Per clinical examination, pt with overactivity and tension of proximal and pelvic floor muscles.  Pt will benefit from skilled physical therapy for down training and flexibility training of proximal muscles.    Chief Complaint:  The pt presents today with left testicular, abdominal and back pain. His pain started in December and he underwent numerous tests without any significant results. This past month or so he had a bladder infection and took antibiotics for the bladder infection and the symptoms did not get better. The pt also has been having lower abdominal and pelvic muscle spasms.    Manny will sometimes get aching/throbbign pain up into his left lower abdomen and left inner thigh. Sometimes this pain is sharp.  His lower back pain has been more persistent the past few months. His pain is not always present. The pt has noted that he has felt more bloated/left lower abdominal pressure.     The pt notes bending forward will increase his pain. When he extends his back it feels like a lower abdominal stretch. Rest is the primary thing that relieves his pain. Overall,  his pain is gradually getting better.    Goals: to figure out if pain is a pelvic floor issue and is resulting in some back pain, be able to run and be more active without pain    Urination   Do you leak on the way to the bathroom or with a strong urge to void? no  Do you leak with cough, sneeze, jumping, running? no  Any other activities that cause leaking? n/a  Do you have triggers that make you feel you can't wait to go to the bathroom? n/a What are they? n/a  How long can you delay the need to urinate? 30 min to a couple hours  How many times do you get up to urinate at night? 1-2x  How many times do you urinate during the day? 5-7x a day  Can you stop the flow of urine when on the toilet? yes  Is the volume of urine passed usually: average  Do you strain to pass urine? no  Do you have a slow or hesitant urinary stream? Yes, a bit of trouble getting started  Do you have difficulty initiating the urine stream? no  How many bladder infections have you had in the last 12 months? 1  Do you feel like you empty completely when voiding? yes  The pt continues to have a film on his urine. He will vaguely have pain after urinating.    Bowel Habits  Frequency of bowel movements? 1-2x a day  Consistency of stool? Soft formed  Do you ignore the urge to defecate? no  Do you strain to pass stool? no  Do you feel that you empty completely? yes    Pelvic Pain  When do you have pelvic pain? No pain with erections, ejaculation, pain with sitting, bending over sometimes or long shifts at work  Are you sexually active? yes  Have you ever been worried for your physical safety? yes  Any abdominal or pelvic surgeries? no  Are you having regular exercise? yes  Have you practiced the PF (kegel) exercise for 4 or more weeks? no    Objective:    POSTURE: rounded shoulders, forward head    FLEXIBILITY:tightness of piriformis, rectus femoris, iliopsoas, and adductors bilaterally L>R      EXTERNAL ASSESSMENT:  Skin condition:normal  Bearing  down/coughing:NT  Muscle contraction/perineal mobility: elevation and urogenital triangle descent  Palpation: ttp and tension of ischiocavernosus, bulbocavernosus, superficial/deep transverse perineal, and levator ani on left side    Hip MMT:   Flexion: Left: 5/5, Right: 5/5   IR: Left: 5/5, Right: 5/5   ER: Left: 5/5, Right: 5/5       Lumbar ROM:   Flexion: finger tips to toes   Extension: mild loss    Special Tests:   Trendelenburg: positive on left side     SIJ Special Tests:   Thigh Thrust: negative bilatearlly      Assessment/Plan:    Patient is a 31 year old adult with pelvic complaints.    Patient has the following significant findings with corresponding treatment plan.                Diagnosis 1:  Pelvic floor overactivity  Pain -  hot/cold therapy, US, electric stimulation, mechanical traction, manual therapy, STS, splint/taping/bracing/orthotics, self management, education, directional preference exercise and home program  Decreased ROM/flexibility - manual therapy, therapeutic exercise, therapeutic activity and home program  Impaired muscle performance - biofeedback, electric stimulation, neuro re-education and home program  Impaired posture - neuro re-education, therapeutic activities and home program    Therapy Evaluation Codes:   Cumulative Therapy Evaluation is: Low complexity.    Previous and current functional limitations:  (See Goal Flow Sheet for this information)    Short term and Long term goals: (See Goal Flow Sheet for this information)     Communication ability:  Patient appears to be able to clearly communicate and understand verbal and written communication and follow directions correctly.  Treatment Explanation - The following has been discussed with the patient:   RX ordered/plan of care  Anticipated outcomes  Possible risks and side effects  This patient would benefit from PT intervention to resume normal activities.   Rehab potential is good.    Frequency:  1 X week, once  daily  Duration:  for 8 weeks  Discharge Plan:  Achieve all LTG.  Independent in home treatment program.  Reach maximal therapeutic benefit.    Please refer to the daily flowsheet for treatment today, total treatment time and time spent performing 1:1 timed codes.

## 2022-04-21 NOTE — PROGRESS NOTES
[facility]    OUTPATIENT Physical Therapy ORTHOPEDIC EVALUATION  PLAN OF TREATMENT FOR OUTPATIENT REHABILITATION  (COMPLETE FOR INITIAL CLAIMS ONLY)  Patient's Last Name, First Name, M.I.  YOB: 1990  Manny Lo    Provider s Name:  [facility]   Medical Record No.  [id number]   Start of Care Date:  04/21/22   Onset Date:   03/08/22   Type:     _X__PT   ___OT Medical Diagnosis:    Encounter Diagnoses   Name Primary?    Testicular pain, left     Left testicular pain         Treatment Diagnosis:  overactivity of pelvic floor muscles        Goals:     04/21/22 0500   Body Part   Goals listed below are for sitting pelvic   Goal #1   Goal #1 sitting   Current Functional Level Hours patient can sit   Performance level 1 hour 5/10p   STG Target Performance Hours patient will be able to sit   Performance level 1 hour 2/10p   Rationale for personal hygiene;to allow rest from standing;for community transportation;for job requirements in their work place   Due date 05/26/22   LTG Target Performance Hours patient will be able to sit   Performance Level 2 hours 2/10p   Rationale for personal hygiene;to allow rest from standing;for community transportation;for job requirements in their work place   Due date 06/16/22       Therapy Frequency:  1x a week  Predicted Duration of Therapy Intervention:  8 weeks    Mylene Grubbs, PT                 I CERTIFY THE NEED FOR THESE SERVICES FURNISHED UNDER        THIS PLAN OF TREATMENT AND WHILE UNDER MY CARE     (Physician attestation of this document indicates review and certification of the therapy plan).                     Certification Date From:  04/21/22   Certification Date To:  06/16/22    Referring Provider:  Rosio Meng    Initial Assessment        See Epic Evaluation SOC Date: 04/21/22

## 2022-04-28 ENCOUNTER — OFFICE VISIT (OUTPATIENT)
Dept: UROLOGY | Facility: CLINIC | Age: 32
End: 2022-04-28
Payer: COMMERCIAL

## 2022-04-28 VITALS
DIASTOLIC BLOOD PRESSURE: 74 MMHG | HEART RATE: 81 BPM | HEIGHT: 71 IN | SYSTOLIC BLOOD PRESSURE: 127 MMHG | WEIGHT: 175 LBS | BODY MASS INDEX: 24.5 KG/M2

## 2022-04-28 DIAGNOSIS — R10.2 PELVIC PAIN IN MALE: Primary | ICD-10-CM

## 2022-04-28 LAB
ALBUMIN UR-MCNC: NEGATIVE MG/DL
APPEARANCE UR: ABNORMAL
BILIRUB UR QL STRIP: NEGATIVE
COLOR UR AUTO: YELLOW
GLUCOSE UR STRIP-MCNC: NEGATIVE MG/DL
HGB UR QL STRIP: NEGATIVE
KETONES UR STRIP-MCNC: NEGATIVE MG/DL
LEUKOCYTE ESTERASE UR QL STRIP: NEGATIVE
MUCOUS THREADS #/AREA URNS LPF: PRESENT /LPF
NITRATE UR QL: NEGATIVE
PH UR STRIP: 5 [PH] (ref 5–7)
RBC URINE: 1 /HPF
SP GR UR STRIP: 1.02 (ref 1–1.03)
UROBILINOGEN UR STRIP-MCNC: NORMAL MG/DL
WBC URINE: 1 /HPF

## 2022-04-28 PROCEDURE — 99214 OFFICE O/P EST MOD 30 MIN: CPT | Performed by: UROLOGY

## 2022-04-28 PROCEDURE — 81001 URINALYSIS AUTO W/SCOPE: CPT | Performed by: PATHOLOGY

## 2022-04-28 RX ORDER — TAMSULOSIN HYDROCHLORIDE 0.4 MG/1
0.4 CAPSULE ORAL DAILY
Qty: 30 CAPSULE | Refills: 11 | Status: SHIPPED | OUTPATIENT
Start: 2022-04-28

## 2022-04-28 ASSESSMENT — PAIN SCALES - GENERAL: PAINLEVEL: NO PAIN (1)

## 2022-04-28 NOTE — LETTER
"4/28/2022       RE: Manny Lo  [address]  [place] [place] [facility] [address]     Dear Colleague,    Thank you for referring your patient, Manny Lo, to the [facility] [place] [facility]. Please see a copy of my visit note below.    Chief Complaint   Patient presents with     Follow Up     Epididymitis; left testicular pain       Blood pressure 127/74, pulse 81, height 1.803 m (5' 11\"), weight 79.4 kg (175 lb). Body mass index is 24.41 kg/m .    Patient Active Problem List   Diagnosis     Testicular pain, left       No Known Allergies    No current outpatient medications on file.       Social History     Tobacco Use     Smoking status: Former Smoker     Smokeless tobacco: Never Used       Yung Massey, EMT  4/28/2022  8:35 AM    HPI:  Manny Lo is a 31 year old adult being seen for follow-up lower urinary tract symptoms and left testis pain.  Started Nov 2021.  Has had course of Bactrim (had to stop, didn't tolerate)  and Levaquin.  CT abdomen/pelvis was not revealing.    Scrotal ultrasound 3/22/22 showed a small left varicocele.    Lower urinary tract symptoms at present- sometimes dysuria- happens most often after ejaculation.  Noting some discharge or white film in the urine.    Voiding with good stream.  Still having some mild abdomen discomfort also.    Lower Urinary Tract Symptoms, at present:    Frequency: varies.  Not overly frequent right now, more frequent  Before.    Urgency: not a lot- some.    Nocturia - not usually.    Weakness of stream:  Pretty good    Intermittency: no, it's steady.    Straining: no    Emptying: yes feeling empty     Hematuria or dysuria: no blood, some dysuria after ejaculation.     He has been doing pelvic floor physical therapy.  PVR last visit was low.  Can't find the exact volume, however.      Exam:  Physical Exam  /74   Pulse 81   Ht 1.803 m (5' 11\")   Wt 79.4 kg (175 lb)   BMI " 24.41 kg/m      General: Alert, oriented, nad.  Pleasant and conversant.  Eyes: anicteric, EOMI.  Pulse: regular  Resps: normal, non-labored.  Abdomen:  Nondistended.  Neurological - no tremors  Skin - no discoloration/ lesions noted   exam   Phallus circumcised  Testes ++, anodular, nontender.  Vas and epididymis present and normal bilaterally  Small left grade II  varicocele noted.  PENG normal prostate, 10cc, anodular, nontender. prostate massage done.    Review of Imaging:  The following imaging exams were independently viewed and interpreted by me and discussed with patient:  Scrotal ultrasound 3/22/22   Impression:   Small left varicocele. Otherwise unremarkable testicular ultrasound.      CT abdomen pelvis w contrast (2/4/2022):  Comparison: None.     Indication: LLQ abdominal pain       Technique: Volumetric helical acquisition of CT images from the lung bases through the symphysis pubis after the administration of intravenous contrast.     DOSE:    Total DLP = 397 mGy.cm.       Findings: Sub-4 mm noncalcified subpleural left lower lobe nodule. Lung bases otherwise clear. No pleural effusion. No pneumothorax.     No focal suspicious hepatic mass. No calcified gallstones. Pancreas, spleen, adrenal glands, and kidneys are within normal limits. No bowel obstruction. Normal appendix. No free air in the abdomen. No free fluid in the pelvis.     Aorta and major branches are patent without aneurysm or significant stenosis. Portal vein and superior mesenteric vein are patent.     No focal suspicious osseous lesion identified.      Impression: No acute pathology identified in the abdomen pelvis.  Clarification including no evidence of left inguinal hernia and no   abnormal left inguinal lymphadenopathy.         Scrotal ultrasound 12/7/21  IMPRESSION:   1.  Normal ultrasound of the scrotum.        Review of Labs:  The following labs were reviewed by me and discussed with the patient:  Recent Results (from the past  720 hour(s))   NEISSERIA GONORRHOEA PCR    Collection Time: 04/13/22  3:25 PM    Specimen: Urine, Voided   Result Value Ref Range    Neisseria gonorrhoeae Negative Negative   CHLAMYDIA TRACHOMATIS PCR    Collection Time: 04/13/22  3:25 PM    Specimen: Urine, Voided   Result Value Ref Range    Chlamydia trachomatis Negative Negative         Component      Latest Ref Rng & Units 3/7/2022 3/23/2022   Color Urine      Colorless, Straw, Light Yellow, Yellow Yellow Yellow   Appearance Urine      Clear Clear Clear   Glucose Urine      Negative mg/dL Negative Negative   Bilirubin Urine      Negative Negative Negative   Ketones Urine      Negative mg/dL Negative Negative   Specific Gravity Urine      1.003 - 1.035 1.030 1.010   Blood Urine      Negative Negative Negative   pH Urine      5.0 - 7.0 5.0 8.0 (H)   Protein Albumin Urine      Negative mg/dL Negative Negative   Urobilinogen mg/dL      Normal, 2.0 mg/dL Normal Normal   Nitrite Urine      Negative Negative Negative   Leukocyte Esterase Urine      Negative Negative Negative   Mucus Urine      None Seen /LPF Present (A) Present (A)   RBC Urine      <=2 /HPF 1 <1   WBC Urine      <=5 /HPF 2 <1   Squamous Epithelial /HPF Urine      <=1 /HPF  <1           Assessment & Plan   1. Left varicocele   2. Lower urinary tract symptoms - unclear etiology.  3. Post-prostate massage UA/UCx today.  4. NSAIDS, consider alpha-blocker.  5. Trial Flomax  6. Continue pelvic floor physical therapy       Steve Encinas MD  [facility] [place]      ==========================      Additional Coding Information:    Problems:  3 -- one acute uncomplicated illness or injury    Data Reviewed  3 or more studies reviewed, as listed above     Tests ordered: UA    Level of risk:  4 -- prescription drug management    Time spent:  30 minutes spent on the date of the encounter doing chart review, history and exam, documentation and further activities per the note      Steve  MD Huang

## 2022-04-28 NOTE — PROGRESS NOTES
"HPI:  Manny Lo is a 31 year old adult being seen for follow-up lower urinary tract symptoms and left testis pain.  Started Nov 2021.  Has had course of Bactrim (had to stop, didn't tolerate)  and Levaquin.  CT abdomen/pelvis was not revealing.    Scrotal ultrasound 3/22/22 showed a small left varicocele.    Lower urinary tract symptoms at present- sometimes dysuria- happens most often after ejaculation.  Noting some discharge or white film in the urine.    Voiding with good stream.  Still having some mild abdomen discomfort also.    Lower Urinary Tract Symptoms, at present:    Frequency: varies.  Not overly frequent right now, more frequent  Before.    Urgency: not a lot- some.    Nocturia - not usually.    Weakness of stream:  Pretty good    Intermittency: no, it's steady.    Straining: no    Emptying: yes feeling empty     Hematuria or dysuria: no blood, some dysuria after ejaculation.     He has been doing pelvic floor physical therapy.  PVR last visit was low.  Can't find the exact volume, however.      Exam:  Physical Exam  /74   Pulse 81   Ht 1.803 m (5' 11\")   Wt 79.4 kg (175 lb)   BMI 24.41 kg/m      General: Alert, oriented, nad.  Pleasant and conversant.  Eyes: anicteric, EOMI.  Pulse: regular  Resps: normal, non-labored.  Abdomen:  Nondistended.  Neurological - no tremors  Skin - no discoloration/ lesions noted   exam   Phallus circumcised  Testes ++, anodular, nontender.  Vas and epididymis present and normal bilaterally  Small left grade II  varicocele noted.  PENG normal prostate, 10cc, anodular, nontender. prostate massage done.    Review of Imaging:  The following imaging exams were independently viewed and interpreted by me and discussed with patient:  Scrotal ultrasound 3/22/22   Impression:   Small left varicocele. Otherwise unremarkable testicular ultrasound.      CT abdomen pelvis w contrast (2/4/2022):  Comparison: None.     Indication: LLQ abdominal pain       Technique: " Volumetric helical acquisition of CT images from the lung bases through the symphysis pubis after the administration of intravenous contrast.     DOSE:    Total DLP = 397 mGy.cm.       Findings: Sub-4 mm noncalcified subpleural left lower lobe nodule. Lung bases otherwise clear. No pleural effusion. No pneumothorax.     No focal suspicious hepatic mass. No calcified gallstones. Pancreas, spleen, adrenal glands, and kidneys are within normal limits. No bowel obstruction. Normal appendix. No free air in the abdomen. No free fluid in the pelvis.     Aorta and major branches are patent without aneurysm or significant stenosis. Portal vein and superior mesenteric vein are patent.     No focal suspicious osseous lesion identified.      Impression: No acute pathology identified in the abdomen pelvis.  Clarification including no evidence of left inguinal hernia and no   abnormal left inguinal lymphadenopathy.         Scrotal ultrasound 12/7/21  IMPRESSION:   1.  Normal ultrasound of the scrotum.        Review of Labs:  The following labs were reviewed by me and discussed with the patient:  Recent Results (from the past 720 hour(s))   NEISSERIA GONORRHOEA PCR    Collection Time: 04/13/22  3:25 PM    Specimen: Urine, Voided   Result Value Ref Range    Neisseria gonorrhoeae Negative Negative   CHLAMYDIA TRACHOMATIS PCR    Collection Time: 04/13/22  3:25 PM    Specimen: Urine, Voided   Result Value Ref Range    Chlamydia trachomatis Negative Negative         Component      Latest Ref Rng & Units 3/7/2022 3/23/2022   Color Urine      Colorless, Straw, Light Yellow, Yellow Yellow Yellow   Appearance Urine      Clear Clear Clear   Glucose Urine      Negative mg/dL Negative Negative   Bilirubin Urine      Negative Negative Negative   Ketones Urine      Negative mg/dL Negative Negative   Specific Gravity Urine      1.003 - 1.035 1.030 1.010   Blood Urine      Negative Negative Negative   pH Urine      5.0 - 7.0 5.0 8.0 (H)   Protein  Albumin Urine      Negative mg/dL Negative Negative   Urobilinogen mg/dL      Normal, 2.0 mg/dL Normal Normal   Nitrite Urine      Negative Negative Negative   Leukocyte Esterase Urine      Negative Negative Negative   Mucus Urine      None Seen /LPF Present (A) Present (A)   RBC Urine      <=2 /HPF 1 <1   WBC Urine      <=5 /HPF 2 <1   Squamous Epithelial /HPF Urine      <=1 /HPF  <1           Assessment & Plan   1. Left varicocele   2. Lower urinary tract symptoms - unclear etiology.  3. Post-prostate massage UA/UCx today.  4. NSAIDS, consider alpha-blocker.  5. Trial Flomax  6. Continue pelvic floor physical therapy       Steve Encinas MD  [facility] [place]      ==========================      Additional Coding Information:    Problems:  3 -- one acute uncomplicated illness or injury    Data Reviewed  3 or more studies reviewed, as listed above     Tests ordered: UA    Level of risk:  4 -- prescription drug management    Time spent:  30 minutes spent on the date of the encounter doing chart review, history and exam, documentation and further activities per the note

## 2022-04-28 NOTE — PROGRESS NOTES
"Chief Complaint   Patient presents with     Follow Up     Epididymitis; left testicular pain       Blood pressure 127/74, pulse 81, height 1.803 m (5' 11\"), weight 79.4 kg (175 lb). Body mass index is 24.41 kg/m .    Patient Active Problem List   Diagnosis     Testicular pain, left       No Known Allergies    No current outpatient medications on file.       Social History     Tobacco Use     Smoking status: Former Smoker     Smokeless tobacco: Never Used       Yung Massey, EMT  4/28/2022  8:35 AM  "

## 2022-04-29 NOTE — RESULT ENCOUNTER NOTE
Dear Manny     Here are your recent post-prostate massage UA test results which are NORMAL.  There is not any evidence of bacterial or inflammatory prostatitis.      Thank You,    Please let me know if you have any questions!    Swathi JOSE

## 2022-06-14 ENCOUNTER — TELEPHONE (OUTPATIENT)
Dept: UROLOGY | Facility: CLINIC | Age: 32
End: 2022-06-14
Payer: COMMERCIAL

## 2022-06-14 NOTE — TELEPHONE ENCOUNTER
[facility] Call Center    Phone Message    May a detailed message be left on voicemail: yes     Reason for Call: Symptoms or Concerns     If patient has red-flag symptoms, warm transfer to triage line    Current symptom or concern: lightheadedness / diarrhea / very minimal ejaculate after ejaculation - patient wonders if it's the tamsulosin and would like to talk to a nurse about it today, asking for a callback    Symptoms have been present for:  1 day(s)    Has patient previously been seen for this? No    By : N/A    Date: N/A    Are there any new or worsening symptoms? Yes: Patient is wondering if he can discontinue tamsulosin      Action Taken: Other: Urology    Travel Screening: Not Applicable

## 2022-10-03 ENCOUNTER — HEALTH MAINTENANCE LETTER (OUTPATIENT)
Age: 32
End: 2022-10-03

## 2023-05-07 ENCOUNTER — HEALTH MAINTENANCE LETTER (OUTPATIENT)
Age: 33
End: 2023-05-07

## 2024-07-14 ENCOUNTER — HEALTH MAINTENANCE LETTER (OUTPATIENT)
Age: 34
End: 2024-07-14